# Patient Record
Sex: FEMALE | Race: WHITE | NOT HISPANIC OR LATINO | URBAN - METROPOLITAN AREA
[De-identification: names, ages, dates, MRNs, and addresses within clinical notes are randomized per-mention and may not be internally consistent; named-entity substitution may affect disease eponyms.]

---

## 2017-09-19 ENCOUNTER — EMERGENCY (EMERGENCY)
Facility: HOSPITAL | Age: 82
LOS: 1 days | Discharge: HOME | End: 2017-09-19
Attending: EMERGENCY MEDICINE
Payer: MEDICARE

## 2017-09-19 VITALS
RESPIRATION RATE: 16 BRPM | SYSTOLIC BLOOD PRESSURE: 190 MMHG | HEIGHT: 62 IN | WEIGHT: 89.95 LBS | OXYGEN SATURATION: 98 % | DIASTOLIC BLOOD PRESSURE: 60 MMHG | TEMPERATURE: 101 F | HEART RATE: 66 BPM

## 2017-09-19 DIAGNOSIS — R05 COUGH: ICD-10-CM

## 2017-09-19 DIAGNOSIS — R50.9 FEVER, UNSPECIFIED: ICD-10-CM

## 2017-09-19 DIAGNOSIS — R04.0 EPISTAXIS: ICD-10-CM

## 2017-09-19 DIAGNOSIS — Z88.0 ALLERGY STATUS TO PENICILLIN: ICD-10-CM

## 2017-09-19 LAB
ALBUMIN SERPL ELPH-MCNC: 3.1 G/DL — LOW (ref 3.5–5)
ALP SERPL-CCNC: 116 U/L — SIGNIFICANT CHANGE UP (ref 40–120)
ALT FLD-CCNC: 49 U/L DA — SIGNIFICANT CHANGE UP (ref 10–60)
ANION GAP SERPL CALC-SCNC: 4 MMOL/L — LOW (ref 5–17)
APTT BLD: 29.6 SEC — SIGNIFICANT CHANGE UP (ref 27.5–37.4)
AST SERPL-CCNC: 56 U/L — HIGH (ref 10–40)
BASOPHILS # BLD AUTO: 0.1 K/UL — SIGNIFICANT CHANGE UP (ref 0–0.2)
BASOPHILS NFR BLD AUTO: 0.7 % — SIGNIFICANT CHANGE UP (ref 0–2)
BILIRUB SERPL-MCNC: 0.7 MG/DL — SIGNIFICANT CHANGE UP (ref 0.2–1.2)
BUN SERPL-MCNC: 22 MG/DL — HIGH (ref 7–18)
CALCIUM SERPL-MCNC: 8.3 MG/DL — LOW (ref 8.4–10.5)
CHLORIDE SERPL-SCNC: 105 MMOL/L — SIGNIFICANT CHANGE UP (ref 96–108)
CO2 SERPL-SCNC: 28 MMOL/L — SIGNIFICANT CHANGE UP (ref 22–31)
CREAT SERPL-MCNC: 0.95 MG/DL — SIGNIFICANT CHANGE UP (ref 0.5–1.3)
EOSINOPHIL # BLD AUTO: 0.1 K/UL — SIGNIFICANT CHANGE UP (ref 0–0.5)
EOSINOPHIL NFR BLD AUTO: 0.9 % — SIGNIFICANT CHANGE UP (ref 0–6)
GLUCOSE SERPL-MCNC: 103 MG/DL — HIGH (ref 70–99)
HCT VFR BLD CALC: 40.4 % — SIGNIFICANT CHANGE UP (ref 34.5–45)
HGB BLD-MCNC: 13.4 G/DL — SIGNIFICANT CHANGE UP (ref 11.5–15.5)
INR BLD: 1.04 RATIO — SIGNIFICANT CHANGE UP (ref 0.88–1.16)
LYMPHOCYTES # BLD AUTO: 0.9 K/UL — LOW (ref 1–3.3)
LYMPHOCYTES # BLD AUTO: 10 % — LOW (ref 13–44)
MCHC RBC-ENTMCNC: 31.5 PG — SIGNIFICANT CHANGE UP (ref 27–34)
MCHC RBC-ENTMCNC: 33.2 GM/DL — SIGNIFICANT CHANGE UP (ref 32–36)
MCV RBC AUTO: 94.8 FL — SIGNIFICANT CHANGE UP (ref 80–100)
MONOCYTES # BLD AUTO: 0.8 K/UL — SIGNIFICANT CHANGE UP (ref 0–0.9)
MONOCYTES NFR BLD AUTO: 9.3 % — SIGNIFICANT CHANGE UP (ref 2–14)
NEUTROPHILS # BLD AUTO: 7 K/UL — SIGNIFICANT CHANGE UP (ref 1.8–7.4)
NEUTROPHILS NFR BLD AUTO: 79.2 % — HIGH (ref 43–77)
PLATELET # BLD AUTO: 113 K/UL — LOW (ref 150–400)
POTASSIUM SERPL-MCNC: 4 MMOL/L — SIGNIFICANT CHANGE UP (ref 3.5–5.3)
POTASSIUM SERPL-SCNC: 4 MMOL/L — SIGNIFICANT CHANGE UP (ref 3.5–5.3)
PROT SERPL-MCNC: 6.3 G/DL — SIGNIFICANT CHANGE UP (ref 6–8.3)
PROTHROM AB SERPL-ACNC: 11.4 SEC — SIGNIFICANT CHANGE UP (ref 9.8–12.7)
RBC # BLD: 4.27 M/UL — SIGNIFICANT CHANGE UP (ref 3.8–5.2)
RBC # FLD: 13.7 % — SIGNIFICANT CHANGE UP (ref 10.3–14.5)
SODIUM SERPL-SCNC: 137 MMOL/L — SIGNIFICANT CHANGE UP (ref 135–145)
WBC # BLD: 8.8 K/UL — SIGNIFICANT CHANGE UP (ref 3.8–10.5)
WBC # FLD AUTO: 8.8 K/UL — SIGNIFICANT CHANGE UP (ref 3.8–10.5)

## 2017-09-19 PROCEDURE — 99284 EMERGENCY DEPT VISIT MOD MDM: CPT

## 2017-09-19 PROCEDURE — 71020: CPT | Mod: 26

## 2017-09-19 RX ORDER — ACETAMINOPHEN 500 MG
975 TABLET ORAL ONCE
Qty: 0 | Refills: 0 | Status: COMPLETED | OUTPATIENT
Start: 2017-09-19 | End: 2017-09-19

## 2017-09-19 RX ADMIN — Medication 975 MILLIGRAM(S): at 22:43

## 2017-09-19 NOTE — ED PROVIDER NOTE - OBJECTIVE STATEMENT
83F hx htn presents to the ED for fever and nose bleed. pt has had nasal congestion cough dry nose since friday. Has had bleeding from nose since Friday- seen by ENT said it was because she had a virus. Now today pt with fever to 102. cough productive of sputum. Pts main concern in nose bleed - no bleeding now.

## 2017-09-19 NOTE — ED PROVIDER NOTE - PROGRESS NOTE DETAILS
patient with ? pna on xray- pt feeling much better. does not want to stay in hospital . offered admission for abx but refuses to stay - pt wants to go home. understands risks of going home. will return for worsening symptoms. Pt ambulating without issue will d/c with abx. Maximilian Espinosa M.D., Attending Physician

## 2017-09-19 NOTE — ED PROVIDER NOTE - PHYSICAL EXAMINATION
Constitutional: mild distress AAOx3  Eyes: PERRLA EOMI  Head: Normocephalic atraumatic  ENT: no active bleeding from nose however scabbing to right anterior plexus  Mouth: MMM  Cardiac: regular rate   Resp: Lungs CTAB  GI: Abd s/nt/nd  Neuro: CN2-12 intact  Skin: No rashes

## 2017-09-19 NOTE — ED PROVIDER NOTE - MEDICAL DECISION MAKING DETAILS
83F hx htn presents to the ED for fever and nose bleed. pt has had nasal congestion cough dry nose since friday. Has had bleeding from nose since Friday- seen by ENT said it was because she had a virus. Now today pt with fever to 102. cough productive of sputum. Pts main concern in nose bleed - no bleeding now. No active bleeding now - concern more for viral vs bacterial pna -will obtain labs xray rvp and reassess. Maximilian Espinosa M.D., Attending Physician

## 2017-09-19 NOTE — ED PROVIDER NOTE - CARE PLAN
Principal Discharge DX:	Epistaxis  Instructions for follow-up, activity and diet:	1. return for worsening symptoms or anything concerning to you  2. take all home meds as prescribed  3. follow up with your pmd call to make an appointment  4. take levaquin as directed  Secondary Diagnosis:	Fever  Secondary Diagnosis:	Cough

## 2017-09-19 NOTE — ED ADULT NURSE NOTE - CHPI ED SYMPTOMS NEG
no chills/no nausea/no loss of consciousness/no numbness/no blurred vision/no vomiting/no syncope/no change in level of consciousness/no weakness

## 2017-09-19 NOTE — ED PROVIDER NOTE - PLAN OF CARE
1. return for worsening symptoms or anything concerning to you  2. take all home meds as prescribed  3. follow up with your pmd call to make an appointment  4. take levaquin as directed

## 2017-09-20 VITALS
HEART RATE: 76 BPM | RESPIRATION RATE: 18 BRPM | OXYGEN SATURATION: 100 % | DIASTOLIC BLOOD PRESSURE: 87 MMHG | TEMPERATURE: 100 F | SYSTOLIC BLOOD PRESSURE: 180 MMHG

## 2017-09-20 LAB
RAPID RVP RESULT: DETECTED
RV+EV RNA SPEC QL NAA+PROBE: DETECTED

## 2017-09-20 PROCEDURE — 99283 EMERGENCY DEPT VISIT LOW MDM: CPT | Mod: 25

## 2017-09-20 PROCEDURE — 87486 CHLMYD PNEUM DNA AMP PROBE: CPT

## 2017-09-20 PROCEDURE — 85027 COMPLETE CBC AUTOMATED: CPT

## 2017-09-20 PROCEDURE — 71046 X-RAY EXAM CHEST 2 VIEWS: CPT

## 2017-09-20 PROCEDURE — 36415 COLL VENOUS BLD VENIPUNCTURE: CPT

## 2017-09-20 PROCEDURE — 87798 DETECT AGENT NOS DNA AMP: CPT

## 2017-09-20 PROCEDURE — 87633 RESP VIRUS 12-25 TARGETS: CPT

## 2017-09-20 PROCEDURE — 80053 COMPREHEN METABOLIC PANEL: CPT

## 2017-09-20 PROCEDURE — 85730 THROMBOPLASTIN TIME PARTIAL: CPT

## 2017-09-20 PROCEDURE — 85610 PROTHROMBIN TIME: CPT

## 2017-09-20 PROCEDURE — 87581 M.PNEUMON DNA AMP PROBE: CPT

## 2017-12-19 ENCOUNTER — EMERGENCY (EMERGENCY)
Facility: HOSPITAL | Age: 82
LOS: 1 days | Discharge: HOME | End: 2017-12-19
Attending: EMERGENCY MEDICINE
Payer: MEDICARE

## 2017-12-19 VITALS
TEMPERATURE: 99 F | DIASTOLIC BLOOD PRESSURE: 58 MMHG | OXYGEN SATURATION: 98 % | RESPIRATION RATE: 16 BRPM | WEIGHT: 87.08 LBS | HEART RATE: 75 BPM | SYSTOLIC BLOOD PRESSURE: 135 MMHG | HEIGHT: 62 IN

## 2017-12-19 LAB
APPEARANCE UR: CLEAR — SIGNIFICANT CHANGE UP
BILIRUB UR-MCNC: NEGATIVE — SIGNIFICANT CHANGE UP
COLOR SPEC: YELLOW — SIGNIFICANT CHANGE UP
DIFF PNL FLD: ABNORMAL
GLUCOSE UR QL: NEGATIVE — SIGNIFICANT CHANGE UP
KETONES UR-MCNC: NEGATIVE — SIGNIFICANT CHANGE UP
LEUKOCYTE ESTERASE UR-ACNC: NEGATIVE — SIGNIFICANT CHANGE UP
NITRITE UR-MCNC: NEGATIVE — SIGNIFICANT CHANGE UP
PH UR: 6 — SIGNIFICANT CHANGE UP (ref 5–8)
PROT UR-MCNC: 30 MG/DL
SP GR SPEC: 1.01 — SIGNIFICANT CHANGE UP (ref 1.01–1.02)
UROBILINOGEN FLD QL: 1

## 2017-12-19 PROCEDURE — 99283 EMERGENCY DEPT VISIT LOW MDM: CPT | Mod: 25

## 2017-12-19 PROCEDURE — 81001 URINALYSIS AUTO W/SCOPE: CPT

## 2017-12-19 PROCEDURE — 87086 URINE CULTURE/COLONY COUNT: CPT

## 2017-12-19 PROCEDURE — 73502 X-RAY EXAM HIP UNI 2-3 VIEWS: CPT

## 2017-12-19 PROCEDURE — 73502 X-RAY EXAM HIP UNI 2-3 VIEWS: CPT | Mod: 26,LT

## 2017-12-19 PROCEDURE — 99283 EMERGENCY DEPT VISIT LOW MDM: CPT

## 2017-12-19 NOTE — ED ADULT NURSE NOTE - OBJECTIVE STATEMENT
STATES SHE FELL FR STATES 12/14/17 SHE WAS ABOUT TO SIT ON THE SEAT OF A BUS , WHEN BUS STARTED MOVING, FELL ON THE FLOOR C/O PAIN TO COCCYX,LEFT HIP ,SENT BY PCP FOR EVALUATION. AMBULATING .

## 2017-12-19 NOTE — ED PROVIDER NOTE - OBJECTIVE STATEMENT
84 y/o F pt, on pacemaker, presents to ED c/o R lower back pain and L hip pain s/p fell on a bus 5 days ago. Pt was seen by Dr. Herbie Sun, GI doctor yesterday, was told to go to ED for evaluation. Pt also notes malodorous urine x 10 days. Pt denies LOC, dysuria, fever, chills, or any other complaints. NKDA. 82 y/o F pt, on pacemaker, presents to ED c/o R lower back pain and L hip pain s/p fell on a bus 5 days ago. Pt was seen by Dr. Herbie Sun, GI doctor yesterday, was told to go to ED for evaluation. Pt also notes malodorous urine x 10 days. Pt denies LOC, dysuria, fever, chills, or any other complaints. ALLERGIES: Penicillin.

## 2017-12-20 LAB
CULTURE RESULTS: SIGNIFICANT CHANGE UP
SPECIMEN SOURCE: SIGNIFICANT CHANGE UP

## 2018-10-22 ENCOUNTER — APPOINTMENT (OUTPATIENT)
Dept: SURGERY | Facility: CLINIC | Age: 83
End: 2018-10-22
Payer: MEDICARE

## 2018-10-22 VITALS
SYSTOLIC BLOOD PRESSURE: 164 MMHG | WEIGHT: 89 LBS | HEIGHT: 62 IN | OXYGEN SATURATION: 99 % | HEART RATE: 60 BPM | BODY MASS INDEX: 16.38 KG/M2 | DIASTOLIC BLOOD PRESSURE: 80 MMHG | TEMPERATURE: 98.2 F

## 2018-10-22 PROCEDURE — 99203 OFFICE O/P NEW LOW 30 MIN: CPT

## 2018-11-20 ENCOUNTER — TRANSCRIPTION ENCOUNTER (OUTPATIENT)
Age: 83
End: 2018-11-20

## 2019-02-07 ENCOUNTER — APPOINTMENT (OUTPATIENT)
Dept: SURGERY | Facility: CLINIC | Age: 84
End: 2019-02-07
Payer: MEDICARE

## 2019-02-07 VITALS
DIASTOLIC BLOOD PRESSURE: 75 MMHG | TEMPERATURE: 98.7 F | HEIGHT: 62 IN | HEART RATE: 68 BPM | BODY MASS INDEX: 17.85 KG/M2 | WEIGHT: 97 LBS | SYSTOLIC BLOOD PRESSURE: 177 MMHG

## 2019-02-07 PROCEDURE — 99213 OFFICE O/P EST LOW 20 MIN: CPT

## 2021-01-01 ENCOUNTER — TRANSCRIPTION ENCOUNTER (OUTPATIENT)
Age: 86
End: 2021-01-01

## 2021-01-01 ENCOUNTER — INPATIENT (INPATIENT)
Facility: HOSPITAL | Age: 86
LOS: 1 days | DRG: 64 | End: 2021-12-05
Attending: INTERNAL MEDICINE | Admitting: INTERNAL MEDICINE
Payer: MEDICARE

## 2021-01-01 VITALS
WEIGHT: 112.66 LBS | OXYGEN SATURATION: 96 % | HEART RATE: 96 BPM | DIASTOLIC BLOOD PRESSURE: 108 MMHG | SYSTOLIC BLOOD PRESSURE: 165 MMHG | RESPIRATION RATE: 18 BRPM

## 2021-01-01 VITALS
DIASTOLIC BLOOD PRESSURE: 68 MMHG | SYSTOLIC BLOOD PRESSURE: 130 MMHG | RESPIRATION RATE: 14 BRPM | OXYGEN SATURATION: 87 % | TEMPERATURE: 98 F | HEART RATE: 95 BPM

## 2021-01-01 DIAGNOSIS — Z51.5 ENCOUNTER FOR PALLIATIVE CARE: ICD-10-CM

## 2021-01-01 DIAGNOSIS — I63.9 CEREBRAL INFARCTION, UNSPECIFIED: ICD-10-CM

## 2021-01-01 DIAGNOSIS — I48.91 UNSPECIFIED ATRIAL FIBRILLATION: ICD-10-CM

## 2021-01-01 DIAGNOSIS — I25.10 ATHEROSCLEROTIC HEART DISEASE OF NATIVE CORONARY ARTERY WITHOUT ANGINA PECTORIS: ICD-10-CM

## 2021-01-01 LAB
ALBUMIN SERPL ELPH-MCNC: 2.9 G/DL — LOW (ref 3.5–5)
ALP SERPL-CCNC: 84 U/L — SIGNIFICANT CHANGE UP (ref 40–120)
ALT FLD-CCNC: 25 U/L DA — SIGNIFICANT CHANGE UP (ref 10–60)
ANION GAP SERPL CALC-SCNC: 7 MMOL/L — SIGNIFICANT CHANGE UP (ref 5–17)
APPEARANCE UR: ABNORMAL
APTT BLD: 23.3 SEC — LOW (ref 27.5–35.5)
AST SERPL-CCNC: 43 U/L — HIGH (ref 10–40)
BASOPHILS # BLD AUTO: 0.01 K/UL — SIGNIFICANT CHANGE UP (ref 0–0.2)
BASOPHILS NFR BLD AUTO: 0.1 % — SIGNIFICANT CHANGE UP (ref 0–2)
BILIRUB SERPL-MCNC: 0.9 MG/DL — SIGNIFICANT CHANGE UP (ref 0.2–1.2)
BILIRUB UR-MCNC: NEGATIVE — SIGNIFICANT CHANGE UP
BUN SERPL-MCNC: 37 MG/DL — HIGH (ref 7–18)
CALCIUM SERPL-MCNC: 9.4 MG/DL — SIGNIFICANT CHANGE UP (ref 8.4–10.5)
CHLORIDE SERPL-SCNC: 103 MMOL/L — SIGNIFICANT CHANGE UP (ref 96–108)
CK SERPL-CCNC: 401 U/L — HIGH (ref 21–215)
CO2 SERPL-SCNC: 26 MMOL/L — SIGNIFICANT CHANGE UP (ref 22–31)
COLOR SPEC: SIGNIFICANT CHANGE UP
CREAT SERPL-MCNC: 0.99 MG/DL — SIGNIFICANT CHANGE UP (ref 0.5–1.3)
CULTURE RESULTS: SIGNIFICANT CHANGE UP
DIFF PNL FLD: ABNORMAL
DIGOXIN SERPL-MCNC: 0.4 NG/ML — LOW (ref 0.8–2)
EOSINOPHIL # BLD AUTO: 0 K/UL — SIGNIFICANT CHANGE UP (ref 0–0.5)
EOSINOPHIL NFR BLD AUTO: 0 % — SIGNIFICANT CHANGE UP (ref 0–6)
GLUCOSE SERPL-MCNC: 165 MG/DL — HIGH (ref 70–99)
GLUCOSE UR QL: NEGATIVE — SIGNIFICANT CHANGE UP
HCT VFR BLD CALC: 44.8 % — SIGNIFICANT CHANGE UP (ref 34.5–45)
HGB BLD-MCNC: 14.8 G/DL — SIGNIFICANT CHANGE UP (ref 11.5–15.5)
IMM GRANULOCYTES NFR BLD AUTO: 0.3 % — SIGNIFICANT CHANGE UP (ref 0–1.5)
INR BLD: 1.08 RATIO — SIGNIFICANT CHANGE UP (ref 0.88–1.16)
KETONES UR-MCNC: NEGATIVE — SIGNIFICANT CHANGE UP
LACTATE SERPL-SCNC: 3.3 MMOL/L — HIGH (ref 0.7–2)
LEUKOCYTE ESTERASE UR-ACNC: NEGATIVE — SIGNIFICANT CHANGE UP
LYMPHOCYTES # BLD AUTO: 0.58 K/UL — LOW (ref 1–3.3)
LYMPHOCYTES # BLD AUTO: 3.7 % — LOW (ref 13–44)
MAGNESIUM SERPL-MCNC: 3.2 MG/DL — HIGH (ref 1.6–2.6)
MCHC RBC-ENTMCNC: 30.3 PG — SIGNIFICANT CHANGE UP (ref 27–34)
MCHC RBC-ENTMCNC: 33 GM/DL — SIGNIFICANT CHANGE UP (ref 32–36)
MCV RBC AUTO: 91.8 FL — SIGNIFICANT CHANGE UP (ref 80–100)
MONOCYTES # BLD AUTO: 1.25 K/UL — HIGH (ref 0–0.9)
MONOCYTES NFR BLD AUTO: 8 % — SIGNIFICANT CHANGE UP (ref 2–14)
NEUTROPHILS # BLD AUTO: 13.64 K/UL — HIGH (ref 1.8–7.4)
NEUTROPHILS NFR BLD AUTO: 87.9 % — HIGH (ref 43–77)
NITRITE UR-MCNC: NEGATIVE — SIGNIFICANT CHANGE UP
NRBC # BLD: 0 /100 WBCS — SIGNIFICANT CHANGE UP (ref 0–0)
PH UR: 6 — SIGNIFICANT CHANGE UP (ref 5–8)
PHOSPHATE SERPL-MCNC: 3.7 MG/DL — SIGNIFICANT CHANGE UP (ref 2.5–4.5)
PLATELET # BLD AUTO: 207 K/UL — SIGNIFICANT CHANGE UP (ref 150–400)
POTASSIUM SERPL-MCNC: 5 MMOL/L — SIGNIFICANT CHANGE UP (ref 3.5–5.3)
POTASSIUM SERPL-SCNC: 5 MMOL/L — SIGNIFICANT CHANGE UP (ref 3.5–5.3)
PROT SERPL-MCNC: 7.3 G/DL — SIGNIFICANT CHANGE UP (ref 6–8.3)
PROT UR-MCNC: 500 MG/DL
PROTHROM AB SERPL-ACNC: 12.8 SEC — SIGNIFICANT CHANGE UP (ref 10.6–13.6)
RAPID RVP RESULT: SIGNIFICANT CHANGE UP
RBC # BLD: 4.88 M/UL — SIGNIFICANT CHANGE UP (ref 3.8–5.2)
RBC # FLD: 14.6 % — HIGH (ref 10.3–14.5)
SARS-COV-2 RNA SPEC QL NAA+PROBE: SIGNIFICANT CHANGE UP
SODIUM SERPL-SCNC: 136 MMOL/L — SIGNIFICANT CHANGE UP (ref 135–145)
SP GR SPEC: 1.02 — SIGNIFICANT CHANGE UP (ref 1.01–1.02)
SPECIMEN SOURCE: SIGNIFICANT CHANGE UP
TROPONIN I, HIGH SENSITIVITY RESULT: 3558.2 NG/L — HIGH
UROBILINOGEN FLD QL: NEGATIVE — SIGNIFICANT CHANGE UP
WBC # BLD: 15.53 K/UL — HIGH (ref 3.8–10.5)
WBC # FLD AUTO: 15.53 K/UL — HIGH (ref 3.8–10.5)

## 2021-01-01 PROCEDURE — 72170 X-RAY EXAM OF PELVIS: CPT | Mod: 26

## 2021-01-01 PROCEDURE — G1004: CPT

## 2021-01-01 PROCEDURE — 93010 ELECTROCARDIOGRAM REPORT: CPT

## 2021-01-01 PROCEDURE — 70450 CT HEAD/BRAIN W/O DYE: CPT | Mod: 26,ME

## 2021-01-01 PROCEDURE — 99291 CRITICAL CARE FIRST HOUR: CPT

## 2021-01-01 PROCEDURE — 71045 X-RAY EXAM CHEST 1 VIEW: CPT | Mod: 26

## 2021-01-01 RX ORDER — ASPIRIN/CALCIUM CARB/MAGNESIUM 324 MG
300 TABLET ORAL ONCE
Refills: 0 | Status: COMPLETED | OUTPATIENT
Start: 2021-01-01 | End: 2021-01-01

## 2021-01-01 RX ORDER — SODIUM CHLORIDE 9 MG/ML
1000 INJECTION, SOLUTION INTRAVENOUS
Refills: 0 | Status: DISCONTINUED | OUTPATIENT
Start: 2021-01-01 | End: 2021-01-01

## 2021-01-01 RX ORDER — SODIUM CHLORIDE 9 MG/ML
1550 INJECTION INTRAMUSCULAR; INTRAVENOUS; SUBCUTANEOUS ONCE
Refills: 0 | Status: COMPLETED | OUTPATIENT
Start: 2021-01-01 | End: 2021-01-01

## 2021-01-01 RX ORDER — MORPHINE SULFATE 50 MG/1
2 CAPSULE, EXTENDED RELEASE ORAL EVERY 6 HOURS
Refills: 0 | Status: DISCONTINUED | OUTPATIENT
Start: 2021-01-01 | End: 2021-01-01

## 2021-01-01 RX ORDER — DIGOXIN 250 MCG
500 TABLET ORAL ONCE
Refills: 0 | Status: COMPLETED | OUTPATIENT
Start: 2021-01-01 | End: 2021-01-01

## 2021-01-01 RX ORDER — MORPHINE SULFATE 50 MG/1
4 CAPSULE, EXTENDED RELEASE ORAL EVERY 6 HOURS
Refills: 0 | Status: DISCONTINUED | OUTPATIENT
Start: 2021-01-01 | End: 2021-01-01

## 2021-01-01 RX ORDER — CEFEPIME 1 G/1
1000 INJECTION, POWDER, FOR SOLUTION INTRAMUSCULAR; INTRAVENOUS ONCE
Refills: 0 | Status: COMPLETED | OUTPATIENT
Start: 2021-01-01 | End: 2021-01-01

## 2021-01-01 RX ADMIN — MORPHINE SULFATE 2 MILLIGRAM(S): 50 CAPSULE, EXTENDED RELEASE ORAL at 00:08

## 2021-01-01 RX ADMIN — CEFEPIME 100 MILLIGRAM(S): 1 INJECTION, POWDER, FOR SOLUTION INTRAMUSCULAR; INTRAVENOUS at 13:22

## 2021-01-01 RX ADMIN — MORPHINE SULFATE 2 MILLIGRAM(S): 50 CAPSULE, EXTENDED RELEASE ORAL at 05:26

## 2021-01-01 RX ADMIN — SODIUM CHLORIDE 100 MILLILITER(S): 9 INJECTION, SOLUTION INTRAVENOUS at 05:26

## 2021-01-01 RX ADMIN — SODIUM CHLORIDE 1550 MILLILITER(S): 9 INJECTION INTRAMUSCULAR; INTRAVENOUS; SUBCUTANEOUS at 14:30

## 2021-01-01 RX ADMIN — MORPHINE SULFATE 2 MILLIGRAM(S): 50 CAPSULE, EXTENDED RELEASE ORAL at 11:35

## 2021-01-01 RX ADMIN — Medication 500 MICROGRAM(S): at 16:00

## 2021-01-01 RX ADMIN — MORPHINE SULFATE 2 MILLIGRAM(S): 50 CAPSULE, EXTENDED RELEASE ORAL at 17:13

## 2021-01-01 RX ADMIN — MORPHINE SULFATE 2 MILLIGRAM(S): 50 CAPSULE, EXTENDED RELEASE ORAL at 00:07

## 2021-01-01 RX ADMIN — SODIUM CHLORIDE 100 MILLILITER(S): 9 INJECTION, SOLUTION INTRAVENOUS at 09:58

## 2021-01-01 RX ADMIN — MORPHINE SULFATE 4 MILLIGRAM(S): 50 CAPSULE, EXTENDED RELEASE ORAL at 17:46

## 2021-01-01 RX ADMIN — SODIUM CHLORIDE 100 MILLILITER(S): 9 INJECTION, SOLUTION INTRAVENOUS at 21:40

## 2021-01-01 RX ADMIN — MORPHINE SULFATE 4 MILLIGRAM(S): 50 CAPSULE, EXTENDED RELEASE ORAL at 18:00

## 2021-01-01 RX ADMIN — SODIUM CHLORIDE 1550 MILLILITER(S): 9 INJECTION INTRAMUSCULAR; INTRAVENOUS; SUBCUTANEOUS at 13:31

## 2021-01-01 RX ADMIN — MORPHINE SULFATE 2 MILLIGRAM(S): 50 CAPSULE, EXTENDED RELEASE ORAL at 11:30

## 2021-01-01 RX ADMIN — CEFEPIME 1000 MILLIGRAM(S): 1 INJECTION, POWDER, FOR SOLUTION INTRAMUSCULAR; INTRAVENOUS at 14:00

## 2021-01-01 RX ADMIN — MORPHINE SULFATE 2 MILLIGRAM(S): 50 CAPSULE, EXTENDED RELEASE ORAL at 17:43

## 2021-01-01 RX ADMIN — Medication 300 MILLIGRAM(S): at 16:00

## 2021-01-01 RX ADMIN — SODIUM CHLORIDE 100 MILLILITER(S): 9 INJECTION, SOLUTION INTRAVENOUS at 00:08

## 2021-12-03 NOTE — ED PROVIDER NOTE - CARE PLAN
1 Principal Discharge DX:	CVA (cerebrovascular accident)   Principal Discharge DX:	CVA (cerebrovascular accident)  Secondary Diagnosis:	Sepsis, unspecified organism  Secondary Diagnosis:	ACS (acute coronary syndrome)

## 2021-12-03 NOTE — ED PROVIDER NOTE - OBJECTIVE STATEMENT
Patient found on floor by neighbor. Last seen normal 2 days ago. Patient unable to give ROS 2/2 AMS.

## 2021-12-03 NOTE — H&P ADULT - PROBLEM SELECTOR PLAN 4
Based on overall poor prognosis and decision by HCP jocelynn Spence, patient is now DNR/DNI and Comfort measure only  Palliative team by Dr. Gomez will follow up on hospice care.

## 2021-12-03 NOTE — H&P ADULT - ASSESSMENT
Pt is a 88 yo F from home, lives alone with PMH of CAD (s/p CABG), AFib (not on A/C) was BIBEMS after she was found minimally responsive on the floor by neighbor. Admitted for right MCA/ICA infarct and not a candidate for tPA. Pt has poor prognosis. The HCP decided on DNR/DNI with comfort measures only and palliative team will follow up.

## 2021-12-03 NOTE — ED ADULT TRIAGE NOTE - CHIEF COMPLAINT QUOTE
found on the floor by the neighbor lying prone , as per ems o2 sat was 76 % on room air, 15 L nrb pt is 96 on arrival

## 2021-12-03 NOTE — H&P ADULT - PROBLEM SELECTOR PLAN 1
Pt was found minimally repsonsive and on the floor   Was last known normal was 2 days ago  not a candidate or tPA  Vitals stable  Physical exam, non verbal, some ROM in the right side, has agonal breathing  CT head showed  NPO now  S/S consulted  started on D5 NS @100cc/hr Pt was found minimally repsonsive and on the floor   Was last known normal was 2 days ago  not a candidate or tPA  Vitals stable  Physical exam, non verbal, some ROM in the right side, has agonal breathing  CT head showed right MCA and ICA infarct  NPO now  S/S consulted  started on D5 NS @100cc/hr

## 2021-12-03 NOTE — ED PROVIDER NOTE - MUSCULOSKELETAL, MLM
Spine appears normal, range of motion is not limited, dependent ecchymosis and edema to left arm diffusely. warm extremities. normal b/l radial pulses.

## 2021-12-03 NOTE — H&P ADULT - NSHPPHYSICALEXAM_GEN_ALL_CORE
Vital Signs Last 24 Hrs  T(C): 36.4 (03 Dec 2021 16:03), Max: 36.4 (03 Dec 2021 11:59)  T(F): 97.6 (03 Dec 2021 16:03), Max: 97.6 (03 Dec 2021 11:59)  HR: 105 (03 Dec 2021 16:03) (96 - 108)  BP: 162/94 (03 Dec 2021 16:03) (160/109 - 165/108)  RR: 20 (03 Dec 2021 16:03) (18 - 20)  SpO2: 96% (03 Dec 2021 16:03) (96% - 100%)    GENERAL: Agonal breathing  HEAD:  Atraumatic, Normocephalic  EYES: EOMI, PERRLA, conjunctiva and sclera clear  ENT: Moist mucous membranes  NECK: Supple, No JVD  CHEST/LUNG: Labored respirations  HEART: Regular rate and rhythm; No murmurs, rubs, or gallops  ABDOMEN: Bowel sounds present; Soft, Nontender, Nondistended. No hepatomegaly  EXTREMITIES:  2+ Peripheral Pulses, brisk capillary refill. No clubbing, cyanosis, or edema  NERVOUS SYSTEM:  Non verbal   MSK: LROM in right upper and lower extremity  SKIN: No rashes or lesions

## 2021-12-03 NOTE — H&P ADULT - HISTORY OF PRESENT ILLNESS
Pt is a 86 yo F from home, lives alone with PMH of CAD (s/p CABG), AFib (not on A/C) was BIBEMS after she was found minimally responsive on the floor by neighbor. Pt is non verbal and based on niece Bebe Wilson who is the HCP, the last seen normal was 2 days ago. She said pt was talking on the phone on Wednesday morning but after that no one was in touch with her.

## 2021-12-03 NOTE — H&P ADULT - ATTENDING COMMENTS
PATIENT WAS EVALUATED , CASE D/W PATIENT STAFF    - ACUTE CVA - PATIENT IS STARTED WITH CONSERVATIVE MANAGEMENT - FAMILY WANTS  IN PATIENT CARRIER   - WILL OBSERVE WITH SERIAL TROPONINS     - GI ADVT PROPHYLAXIS    - DR. ROSALINE CORTES

## 2021-12-03 NOTE — ED PROVIDER NOTE - PROGRESS NOTE DETAILS
Called by radiology. Patient had a CVA in MCA territory. Explains patient's poor mental status. Patient's niece at bedside. Confirmed that patient was last seen normal on 12/1. Patient endorsed to Dr. Malone. I spoke with niece re: CAMILO. She is patient's healthcare proxy. Patient is  and has not children. Patient is DNR/DNI. Wants to continue other treatments to see if patient has some recovery. I called code sepsis for leukocytosis and tachycardia.

## 2021-12-03 NOTE — H&P ADULT - PROBLEM SELECTOR PLAN 3
Pt has h/o Afib  based on her medication list, she was not on any A/C  was on digoxin at home  in ED pt was AFib with RVR of 140s  was given dig loading  holding meds for pt is NPO

## 2021-12-03 NOTE — ED ADULT NURSE NOTE - NSIMPLEMENTINTERV_GEN_ALL_ED
Implemented All Fall with Harm Risk Interventions:  [place] to call system. Call bell, personal items and telephone within reach. Instruct patient to call for assistance. Room bathroom lighting operational. Non-slip footwear when patient is off stretcher. Physically safe environment: no spills, clutter or unnecessary equipment. Stretcher in lowest position, wheels locked, appropriate side rails in place. Provide visual cue, wrist band, yellow gown, etc. Monitor gait and stability. Monitor for mental status changes and reorient to person, place, and time. Review medications for side effects contributing to fall risk. Reinforce activity limits and safety measures with patient and family. Provide visual clues: red socks.

## 2021-12-03 NOTE — PATIENT PROFILE ADULT - FALL HARM RISK - HARM RISK INTERVENTIONS

## 2021-12-03 NOTE — ED ADULT NURSE NOTE - NS ED NURSE DISCH DISPOSITION
During post-op call pt reports mild throat pain and states there is a \"bulge or little bump\" on her chest/neck area that is sensitive to touch. Pt states she is not experiencing any new shortness of breath, bleeding, cough, or fevers and is eating and drinking. Pt instructed to monitor symptoms and report to Dr. Car's office. Pt provided with phone number to office.   
Admitted

## 2021-12-03 NOTE — ED PROVIDER NOTE - CLINICAL SUMMARY MEDICAL DECISION MAKING FREE TEXT BOX
Patient with AMS and left-sided dense hemiparesis. Likely CVA. Not a TPA candidate due to unknown time of onset. Will get labs, CTH, admit.

## 2021-12-04 NOTE — PROGRESS NOTE ADULT - SUBJECTIVE AND OBJECTIVE BOX
Patient is a 87y old  Female who presents with a chief complaint of CVA (03 Dec 2021 18:01)    PATIENT IS SEEN AND EXAMINED IN MEDICAL FLOOR.    BREANNET [    ]    LUZ MARINA [   ]      GT [   ]    ALLERGIES:  No Known Allergies      Daily     Daily     VITALS:    Vital Signs Last 24 Hrs  T(C): 36.3 (04 Dec 2021 05:16), Max: 36.4 (03 Dec 2021 11:59)  T(F): 97.3 (04 Dec 2021 05:16), Max: 97.6 (03 Dec 2021 11:59)  HR: 88 (04 Dec 2021 05:16) (73 - 108)  BP: 155/83 (04 Dec 2021 05:16) (106/61 - 165/108)  BP(mean): --  RR: 20 (04 Dec 2021 05:16) (18 - 20)  SpO2: 100% (04 Dec 2021 05:16) (96% - 100%)    LABS:    CBC Full  -  ( 03 Dec 2021 12:13 )  WBC Count : 15.53 K/uL  RBC Count : 4.88 M/uL  Hemoglobin : 14.8 g/dL  Hematocrit : 44.8 %  Platelet Count - Automated : 207 K/uL  Mean Cell Volume : 91.8 fl  Mean Cell Hemoglobin : 30.3 pg  Mean Cell Hemoglobin Concentration : 33.0 gm/dL  Auto Neutrophil # : 13.64 K/uL  Auto Lymphocyte # : 0.58 K/uL  Auto Monocyte # : 1.25 K/uL  Auto Eosinophil # : 0.00 K/uL  Auto Basophil # : 0.01 K/uL  Auto Neutrophil % : 87.9 %  Auto Lymphocyte % : 3.7 %  Auto Monocyte % : 8.0 %  Auto Eosinophil % : 0.0 %  Auto Basophil % : 0.1 %    PT/INR - ( 03 Dec 2021 12:13 )   PT: 12.8 sec;   INR: 1.08 ratio         PTT - ( 03 Dec 2021 12:13 )  PTT:23.3 sec  12-03    136  |  103  |  37<H>  ----------------------------<  165<H>  5.0   |  26  |  0.99    Ca    9.4      03 Dec 2021 12:13  Phos  3.7     12-03  Mg     3.2     12-03    TPro  7.3  /  Alb  2.9<L>  /  TBili  0.9  /  DBili  x   /  AST  43<H>  /  ALT  25  /  AlkPhos  84  12-03    CAPILLARY BLOOD GLUCOSE      POCT Blood Glucose.: 196 mg/dL (03 Dec 2021 11:29)    CARDIAC MARKERS ( 03 Dec 2021 12:13 )  x     / x     / 401 U/L / x     / x          LIVER FUNCTIONS - ( 03 Dec 2021 12:13 )  Alb: 2.9 g/dL / Pro: 7.3 g/dL / ALK PHOS: 84 U/L / ALT: 25 U/L DA / AST: 43 U/L / GGT: x           Creatinine Trend: 0.99<--  I&O's Summary              MEDICATIONS:    MEDICATIONS  (STANDING):  dextrose 5% + sodium chloride 0.9%. 1000 milliLiter(s) (100 mL/Hr) IV Continuous <Continuous>  morphine  - Injectable 2 milliGRAM(s) IV Push every 6 hours      MEDICATIONS  (PRN):        REVIEW OF SYSTEMS:                           ALL ROS DONE [ X   ]      CONSTITUTIONAL:  LETHARGIC [   ], FEVER [   ], UNRESPONSIVE [   ]  CVS:  CP  [   ], SOB, [   ], PALPITATIONS [   ], DIZZYNESS [   ]  RS: COUGH [   ], SPUTUM [   ]  GI: ABDOMINAL PAIN [   ], NAUSEA [   ], VOMITINGS [   ], DIARRHEA [   ], CONSTIPATION [   ]  :  DYSURIA [   ], NOCTURIA [   ], INCREASED FREQUENCY [   ], DRIBLING [   ],  SKELETAL: PAINFUL JOINTS [   ], SWOLLEN JOINTS [   ], NECK ACHE [   ], LOW BACK ACHE [   ],  SKIN : ULCERS [   ], RASH [   ], ITCHING [   ]  CNS: HEAD ACHE [   ], DOUBLE VISION [   ], BLURRED VISION [   ], AMS / CONFUSION [   ], SEIZURES [   ], WEAKNESS [   ],TINGLING / NUMBNESS [   ]      PHYSICAL EXAMINATION:    GENERAL APPEARANCE: NO DISTRESS  HEENT:  NO PALLOR, NO  JVD,  NO   NODES, NECK SUPPLE  CVS: S1 +, S2 +,   RS: AEEB,  OCCASIONAL  RALES +,   NO RONCHI  ABD: SOFT, NT, NO, BS +  EXT: NO PE  SKIN: WARM,   SKELETAL:  ROM ACCEPTABLE  CNS:  AAO X    ,   DEFICITS        RADIOLOGY :          ASSESSMENT :     Cerebral infarction    Yes    Yes    Atrial fibrillation    CAD (coronary artery disease)        PLAN:  HPI:  Pt is a 88 yo F from home, lives alone with PMH of CAD (s/p CABG), AFib (not on A/C) was BIBEMS after she was found minimally responsive on the floor by neighbor. Pt is non verbal and based on niece Bebe Montillajovanni who is the HCP, the last seen normal was 2 days ago. She said pt was talking on the phone on Wednesday morning but after that no one was in touch with her. (03 Dec 2021 18:01)    - ACUTE CVA - PATIENT IS STARTED WITH CONSERVATIVE MANAGEMENT - FAMILY WANTS  IN PATIENT HOSPICE , PATIENT IS DNR / DNI / COMFORT CARE   - WILL CONTINUE NPO, IV FLUIDS, IV. MORPHINE 2 MG Q 6HRS    - WILL OBSERVE WITH SERIAL TROPONINS     - PROGNOSIS IS POOR     - GI ADVT PROPHYLAXIS    - DR. ROSALINE CORTES .     Patient is a 87y old  Female who presents with a chief complaint of CVA (03 Dec 2021 18:01)    PATIENT IS SEEN AND EXAMINED IN MEDICAL FLOOR.    BREANNET [    ]    LUZ MARINA [   ]      GT [   ]    ALLERGIES:  No Known Allergies      Daily     Daily     VITALS:    Vital Signs Last 24 Hrs  T(C): 36.3 (04 Dec 2021 05:16), Max: 36.4 (03 Dec 2021 11:59)  T(F): 97.3 (04 Dec 2021 05:16), Max: 97.6 (03 Dec 2021 11:59)  HR: 88 (04 Dec 2021 05:16) (73 - 108)  BP: 155/83 (04 Dec 2021 05:16) (106/61 - 165/108)  BP(mean): --  RR: 20 (04 Dec 2021 05:16) (18 - 20)  SpO2: 100% (04 Dec 2021 05:16) (96% - 100%)    LABS:    CBC Full  -  ( 03 Dec 2021 12:13 )  WBC Count : 15.53 K/uL  RBC Count : 4.88 M/uL  Hemoglobin : 14.8 g/dL  Hematocrit : 44.8 %  Platelet Count - Automated : 207 K/uL  Mean Cell Volume : 91.8 fl  Mean Cell Hemoglobin : 30.3 pg  Mean Cell Hemoglobin Concentration : 33.0 gm/dL  Auto Neutrophil # : 13.64 K/uL  Auto Lymphocyte # : 0.58 K/uL  Auto Monocyte # : 1.25 K/uL  Auto Eosinophil # : 0.00 K/uL  Auto Basophil # : 0.01 K/uL  Auto Neutrophil % : 87.9 %  Auto Lymphocyte % : 3.7 %  Auto Monocyte % : 8.0 %  Auto Eosinophil % : 0.0 %  Auto Basophil % : 0.1 %    PT/INR - ( 03 Dec 2021 12:13 )   PT: 12.8 sec;   INR: 1.08 ratio         PTT - ( 03 Dec 2021 12:13 )  PTT:23.3 sec  12-03    136  |  103  |  37<H>  ----------------------------<  165<H>  5.0   |  26  |  0.99    Ca    9.4      03 Dec 2021 12:13  Phos  3.7     12-03  Mg     3.2     12-03    TPro  7.3  /  Alb  2.9<L>  /  TBili  0.9  /  DBili  x   /  AST  43<H>  /  ALT  25  /  AlkPhos  84  12-03    CAPILLARY BLOOD GLUCOSE      POCT Blood Glucose.: 196 mg/dL (03 Dec 2021 11:29)    CARDIAC MARKERS ( 03 Dec 2021 12:13 )  x     / x     / 401 U/L / x     / x          LIVER FUNCTIONS - ( 03 Dec 2021 12:13 )  Alb: 2.9 g/dL / Pro: 7.3 g/dL / ALK PHOS: 84 U/L / ALT: 25 U/L DA / AST: 43 U/L / GGT: x           Creatinine Trend: 0.99<--  I&O's Summary              MEDICATIONS:    MEDICATIONS  (STANDING):  dextrose 5% + sodium chloride 0.9%. 1000 milliLiter(s) (100 mL/Hr) IV Continuous <Continuous>  morphine  - Injectable 2 milliGRAM(s) IV Push every 6 hours      MEDICATIONS  (PRN):        REVIEW OF SYSTEMS:                           ALL ROS DONE [ X   ]      CONSTITUTIONAL:  LETHARGIC [   ], FEVER [   ], UNRESPONSIVE [   ]  CVS:  CP  [   ], SOB, [   ], PALPITATIONS [   ], DIZZYNESS [   ]  RS: COUGH [   ], SPUTUM [   ]  GI: ABDOMINAL PAIN [   ], NAUSEA [   ], VOMITINGS [   ], DIARRHEA [   ], CONSTIPATION [   ]  :  DYSURIA [   ], NOCTURIA [   ], INCREASED FREQUENCY [   ], DRIBLING [   ],  SKELETAL: PAINFUL JOINTS [   ], SWOLLEN JOINTS [   ], NECK ACHE [   ], LOW BACK ACHE [   ],  SKIN : ULCERS [   ], RASH [   ], ITCHING [   ]  CNS: HEAD ACHE [   ], DOUBLE VISION [   ], BLURRED VISION [   ], AMS / CONFUSION [   ], SEIZURES [   ], WEAKNESS [   ],TINGLING / NUMBNESS [   ]      PHYSICAL EXAMINATION:    GENERAL APPEARANCE: NO DISTRESS  HEENT:  NO PALLOR, NO  JVD,  NO   NODES, NECK SUPPLE  CVS: S1 +, S2 +,   RS: AEEB,  OCCASIONAL  RALES +,   NO RONCHI  ABD: SOFT, NT, NO, BS +  EXT: NO PE  SKIN: WARM,   SKELETAL:  UNRESPONSIVE   CNS:  AAO X 0  , OBTUNDED & UNRESPONSIVE         RADIOLOGY :    < from: CT Head No Cont (12.03.21 @ 12:23) >    IMPRESSION:    -Findingssuggestive of acute to subacute right MCA territory infarct involving the frontal insular region. Hyperdense distal right ICA/proximal MCA suggestive of thrombosis. Recommend CTA head and neck and/or MRI for confirmation.    -Left periorbital, left forehead, and left parieto-occipital scalp swelling. No fracture identified.    -No acute intracranial hemorrhage.    -Mild prominence of the lateral ventricles with secondary findings which can be seen in the setting of normal pressure hydrocephalus.    Findings discussed with Dr. Valderrama in the ER at 1:20 PM 12/3/2021.    < end of copied text >    < from: Xray Chest 1 View- PORTABLE-Urgent (12.03.21 @ 12:14) >    INTERPRETATION:  Pelvis and chest. Patient has altered mental status had a fall.    Pelvis.    Slight right lower lumbar curve. Degenerative loss of disc height concentrated at L4-5.    Hips are relatively free of degeneration and appear symmetric. No bone destruction or fracture.    AP erect chest on December 3, 2021 at 11:56 AM.    COMPARISON: None available.    Heart is quite enlarged. Sternotomy and left-sided pacemaker are noted.    Vague small infiltrate right upper outer lung field noted. Heart obscures most of the left lung.    No visible fracture.    IMPRESSION: Small infiltrate right upper outer chest. No acute bony finding. Heart enlargement, sternotomy, and pacemaker. Heart obscures left base.    < end of copied text >        ASSESSMENT :     Cerebral infarction    Yes    Yes    Atrial fibrillation    CAD (coronary artery disease)        PLAN:  HPI:  Pt is a 86 yo F from home, lives alone with PMH of CAD (s/p CABG), AFib (not on A/C) was BIBEMS after she was found minimally responsive on the floor by neighbor. Pt is non verbal and based on niece Bebe Wilson who is the HCP, the last seen normal was 2 days ago. She said pt was talking on the phone on Wednesday morning but after that no one was in touch with her. (03 Dec 2021 18:01)    - ACUTE CVA  ( RIGHT MCA INFARCT ) - PATIENT IS STARTED WITH CONSERVATIVE MANAGEMENT - FAMILY WANTS  IN PATIENT HOSPICE , PATIENT IS DNR / DNI / COMFORT CARE   - WILL CONTINUE NPO, IV FLUIDS, IV. MORPHINE 2 MG Q 6HRS      - PALLIATIVE CARE / HOSPICE CARE TO BE INITIATED - D/W FLOOR TEAM    - PROGNOSIS IS POOR     - GI ADVT PROPHYLAXIS    - DR. ROSALINE CORTES .

## 2021-12-05 NOTE — DISCHARGE NOTE FOR THE EXPIRED PATIENT - NS PATIENT DEATH CRITERIA
no pulse, no respiration, no corneal reflexes/Patient is dead based on Cardiopulmonary criteria including absent breath sounds, pulselessness and/or asystole

## 2021-12-05 NOTE — DISCHARGE NOTE FOR THE EXPIRED PATIENT - HOSPITAL COURSE
Pt is a 86 yo F from home, lives alone with PMH of CAD (s/p CABG), AFib (not on A/C) was BIBEMS after she was found minimally responsive on the floor by neighbor. Pt is non verbal and based on niece Bebe Wilson who is the HCP, the last seen normal was 2 days ago. She said pt was talking on the phone on Wednesday morning but after that no one was in touch with her. (03 Dec 2021 18:01)  Per HCP Bebe Wilson patient DNR/DNI with comfort measures only  Pt is a 86 yo F from home, lives alone with PMH of CAD (s/p CABG), AFib (not on A/C) was BIBEMS after she was found minimally responsive on the floor by neighbor. Pt is non verbal and based on niece Bebe Wilson who is the HCP, the last seen normal was 2 days ago. She said pt was talking on the phone on Wednesday morning but after that no one was in touch with her. (03 Dec 2021 18:01)  Patient found with established acute to sub-acute Right MCA and Right ICA territory stroke, with LKW 2 days prior hospital presentation so not eligible for any acute treatement such as Alteplase or thromectomy.     Per HCP Bebe Wilson patient made DNR/DNI with comfort measures only

## 2021-12-08 LAB
CULTURE RESULTS: SIGNIFICANT CHANGE UP
CULTURE RESULTS: SIGNIFICANT CHANGE UP
SPECIMEN SOURCE: SIGNIFICANT CHANGE UP
SPECIMEN SOURCE: SIGNIFICANT CHANGE UP

## 2024-12-26 NOTE — ED ADULT NURSE NOTE - NS ED NURSE LEVEL OF CONSCIOUSNESS ORIENTATION
Status post open reduction internal fixation right hip subtrochanteric fracture. Progressing with physical therapy.  Ambulating 32 ft.   Oriented - self; Oriented - place; Oriented - time